# Patient Record
Sex: FEMALE | Race: WHITE | Employment: FULL TIME | URBAN - METROPOLITAN AREA
[De-identification: names, ages, dates, MRNs, and addresses within clinical notes are randomized per-mention and may not be internally consistent; named-entity substitution may affect disease eponyms.]

---

## 2023-04-17 ENCOUNTER — HOSPITAL ENCOUNTER (OUTPATIENT)
Dept: PHYSICAL THERAPY | Age: 33
Discharge: HOME | End: 2023-04-17
Payer: COMMERCIAL

## 2023-04-17 ENCOUNTER — APPOINTMENT (OUTPATIENT)
Dept: PHYSICAL THERAPY | Age: 33
End: 2023-04-17
Payer: COMMERCIAL

## 2023-04-17 PROCEDURE — 97112 NEUROMUSCULAR REEDUCATION: CPT | Performed by: PHYSICAL MEDICINE & REHABILITATION

## 2023-04-17 PROCEDURE — 97110 THERAPEUTIC EXERCISES: CPT | Performed by: PHYSICAL MEDICINE & REHABILITATION

## 2023-04-17 NOTE — PROGRESS NOTES
PT DAILY TREATMENT NOTE 2-15    Patient Name: Jacqueline Mobley  QXRX:  : 1990  [x]  Patient  Verified  Payor: Leydi Moses / Plan: Sarahi Roberts / Product Type: Commerical /    In time:   Out time: 0800  Total Treatment Time (min): 45  Visit #:  2    Treatment Area: Bilateral hip pain [M25.551, M25.552]    SUBJECTIVE  Pain Level (0-10 scale): 0  Any medication changes, allergies to medications, adverse drug reactions, diagnosis change, or new procedure performed?: [x] No    [] Yes (see summary sheet for update)  Subjective functional status/changes:   [] No changes reported    Ordered Vaginsimus vaginal dilator set. OBJECTIVE    PERFECT score complete. External Pelvic Exam  Non tender through external pelvic clock  No POP at rest or with sustained valsalva  Active contraction: present  Active relaxation: minimal   Involuntary relaxation:  present     Internal Vaginal Exam:  Layer 1  Tender, hypertonic  Layer 2/3  Non tender, hypertonic  Bladder neck mobility:  WNL    PERFECT SCORE CHART  P =  Power (Laycock Scale Grade 0-5)   4/5 difficulty relaxing between squeezes  E =  Endurance (How long pt holds max contraction)   10 seconds  R =  Repetitions (How many times the repeats holds)  5+  F =  Fast Twitch (How many 1 second contractions in 10 seconds) 8  E =  Elevation (Lift of post vaginal wall toward pubic bone)   present  C =  Coordinated cocontraction of transverse abdominus present  T = Timing (squeeze and lift with cough)  present    30  min Therapeutic Exercise:  [] See flow sheet :    PFMT downtraining with breath coordination    ROSENDO:  Standing Posterior Medistinum expansion     Rationale: improve coordination to improve the patients ability to eliminate pelvic pain with IADLs       15 min Neuromuscular Re-education:  []  See flow sheet :    PFMT with manual cues for downtraining.       Rationale: improve coordination and increase proprioception  to improve the patients ability to eliminate pelvic pain with IADLs               With   [x] TE   [] TA   [x] Neuro   [] SC   [] other: Patient Education: [x] Review HEP    [] Progressed/Changed HEP based on:   [x] positioning   [] body mechanics   [] transfers   [] heat/ice application    [] other:      Other Objective/Functional Measures: Demonstrated good understanding of all concepts and exs practiced today     Pain Level (0-10 scale) post treatment: 0    ASSESSMENT/Changes in Function:     Patient will continue to benefit from skilled PT services to modify and progress therapeutic interventions to attain remaining goals. [x]  See Plan of Care  []  See progress note/recertification  []  See Discharge Summary         Progress towards goals / Updated goals:  Able to advance exercises today with good tolerance. Pt continues to need instruction for correct exercise form and performance. Continues to demonstrate good potential to achieve functional goals stated on the plan of care.        PLAN  [x]  Upgrade activities as tolerated     []  Continue plan of care  []  Update interventions per flow sheet       []  Discharge due to:_  []  Other:_      Phyllistine Rasp, PT, MSPT, PCES 4/17/2023

## 2023-04-27 ENCOUNTER — APPOINTMENT (OUTPATIENT)
Dept: PHYSICAL THERAPY | Age: 33
End: 2023-04-27
Payer: COMMERCIAL

## 2023-05-10 ENCOUNTER — APPOINTMENT (OUTPATIENT)
Dept: PHYSICAL THERAPY | Age: 33
End: 2023-05-10

## 2023-05-10 ENCOUNTER — HOSPITAL ENCOUNTER (OUTPATIENT)
Facility: HOSPITAL | Age: 33
Setting detail: RECURRING SERIES
Discharge: HOME | End: 2023-05-13
Payer: COMMERCIAL

## 2023-05-10 PROCEDURE — 97112 NEUROMUSCULAR REEDUCATION: CPT

## 2023-05-10 PROCEDURE — 97535 SELF CARE MNGMENT TRAINING: CPT

## 2023-05-10 NOTE — PROGRESS NOTES
Progressed/Changed HEP, detail:    [] Other detail:         Other Objective/Functional Measures  Patient demonstrates good understanding of HeP    Pain Level at end of session (0-10 scale): 0      Assessment     Patient will continue to benefit from skilled PT / OT services to modify and progress therapeutic interventions, analyze and address functional mobility deficits, analyze and address ROM deficits, analyze and address strength deficits, analyze and address soft tissue restrictions, analyze and cue for proper movement patterns, and analyze and modify for postural abnormalities to address functional deficits and attain remaining goals.     Progress toward goals / Updated goals:  []  See Progress Note/Recertification    Demonstrates good potential at this time      PLAN  Yes  Continue plan of care  Re-Cert Due: 50/54  []  Upgrade activities as tolerated  []  Discharge due to :  []  Other:      Gato Doll, PT       5/10/2023       3:14 PM